# Patient Record
Sex: FEMALE | Race: OTHER | Employment: STUDENT | URBAN - METROPOLITAN AREA
[De-identification: names, ages, dates, MRNs, and addresses within clinical notes are randomized per-mention and may not be internally consistent; named-entity substitution may affect disease eponyms.]

---
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
## 2023-06-14 ENCOUNTER — NURSE ONLY (OUTPATIENT)
Dept: INTERNAL MEDICINE CLINIC | Facility: HOSPITAL | Age: 23
End: 2023-06-14
Attending: EMERGENCY MEDICINE

## 2023-06-14 DIAGNOSIS — Z00.00 WELLNESS EXAMINATION: Primary | ICD-10-CM

## 2023-06-14 PROCEDURE — 86480 TB TEST CELL IMMUN MEASURE: CPT

## 2023-06-16 LAB
M TB IFN-G CD4+ T-CELLS BLD-ACNC: 0.04 IU/ML
M TB TUBERC IFN-G BLD QL: NEGATIVE
M TB TUBERC IGNF/MITOGEN IGNF CONTROL: >10 IU/ML
QFT TB1 AG MINUS NIL: 0.01 IU/ML
QFT TB2 AG MINUS NIL: 0 IU/ML

## 2023-11-21 ENCOUNTER — OFFICE VISIT (OUTPATIENT)
Dept: FAMILY MEDICINE CLINIC | Facility: CLINIC | Age: 23
End: 2023-11-21

## 2023-11-21 VITALS
BODY MASS INDEX: 33 KG/M2 | SYSTOLIC BLOOD PRESSURE: 125 MMHG | DIASTOLIC BLOOD PRESSURE: 76 MMHG | WEIGHT: 186 LBS | HEART RATE: 58 BPM

## 2023-11-21 DIAGNOSIS — N83.202 CYSTS OF BOTH OVARIES: ICD-10-CM

## 2023-11-21 DIAGNOSIS — Z00.00 ENCOUNTER FOR ANNUAL HEALTH EXAMINATION: Primary | ICD-10-CM

## 2023-11-21 DIAGNOSIS — R10.2 PELVIC PAIN: ICD-10-CM

## 2023-11-21 DIAGNOSIS — N83.201 CYSTS OF BOTH OVARIES: ICD-10-CM

## 2023-11-21 DIAGNOSIS — F41.8 ANXIETY WITH DEPRESSION: ICD-10-CM

## 2023-11-21 DIAGNOSIS — Z23 NEEDS FLU SHOT: ICD-10-CM

## 2023-11-21 PROCEDURE — 3074F SYST BP LT 130 MM HG: CPT | Performed by: FAMILY MEDICINE

## 2023-11-21 PROCEDURE — 3078F DIAST BP <80 MM HG: CPT | Performed by: FAMILY MEDICINE

## 2023-11-21 PROCEDURE — 99385 PREV VISIT NEW AGE 18-39: CPT | Performed by: FAMILY MEDICINE

## 2023-12-27 ENCOUNTER — OFFICE VISIT (OUTPATIENT)
Dept: OBGYN CLINIC | Facility: CLINIC | Age: 23
End: 2023-12-27

## 2023-12-27 VITALS
WEIGHT: 190 LBS | HEART RATE: 54 BPM | SYSTOLIC BLOOD PRESSURE: 110 MMHG | DIASTOLIC BLOOD PRESSURE: 76 MMHG | BODY MASS INDEX: 31.65 KG/M2 | HEIGHT: 65 IN

## 2023-12-27 DIAGNOSIS — Z01.419 WELL WOMAN EXAM: Primary | ICD-10-CM

## 2023-12-27 DIAGNOSIS — Z11.3 SCREENING EXAMINATION FOR STD (SEXUALLY TRANSMITTED DISEASE): ICD-10-CM

## 2023-12-27 PROCEDURE — 99385 PREV VISIT NEW AGE 18-39: CPT | Performed by: STUDENT IN AN ORGANIZED HEALTH CARE EDUCATION/TRAINING PROGRAM

## 2023-12-27 PROCEDURE — 3008F BODY MASS INDEX DOCD: CPT | Performed by: STUDENT IN AN ORGANIZED HEALTH CARE EDUCATION/TRAINING PROGRAM

## 2023-12-27 PROCEDURE — 3074F SYST BP LT 130 MM HG: CPT | Performed by: STUDENT IN AN ORGANIZED HEALTH CARE EDUCATION/TRAINING PROGRAM

## 2023-12-27 PROCEDURE — 3078F DIAST BP <80 MM HG: CPT | Performed by: STUDENT IN AN ORGANIZED HEALTH CARE EDUCATION/TRAINING PROGRAM

## 2023-12-27 RX ORDER — NORETHINDRONE ACETATE AND ETHINYL ESTRADIOL, ETHINYL ESTRADIOL AND FERROUS FUMARATE 1MG-10(24)
1 KIT ORAL DAILY
Qty: 28 TABLET | Refills: 12 | Status: SHIPPED | OUTPATIENT
Start: 2023-12-27 | End: 2024-12-26

## 2023-12-28 LAB
C TRACH DNA SPEC QL NAA+PROBE: NEGATIVE
N GONORRHOEA DNA SPEC QL NAA+PROBE: NEGATIVE

## 2024-01-02 ENCOUNTER — PATIENT MESSAGE (OUTPATIENT)
Dept: OBGYN CLINIC | Facility: CLINIC | Age: 24
End: 2024-01-02

## 2024-01-02 RX ORDER — METRONIDAZOLE 500 MG/1
500 TABLET ORAL 2 TIMES DAILY
Qty: 14 TABLET | Refills: 0 | Status: SHIPPED | OUTPATIENT
Start: 2024-01-02

## 2024-01-02 NOTE — TELEPHONE ENCOUNTER
From: Alissa Blakely  To: JED HAMILTON  Sent: 1/2/2024 3:24 PM CST  Subject: Medication    Hi! So yes I did notice more discharge and to be on the safer side could I get the antibiotics?

## 2024-03-12 RX ORDER — NORETHINDRONE ACETATE AND ETHINYL ESTRADIOL, ETHINYL ESTRADIOL AND FERROUS FUMARATE 1MG-10(24)
1 KIT ORAL DAILY
Qty: 28 TABLET | Refills: 12 | OUTPATIENT
Start: 2024-03-12 | End: 2025-03-12

## 2024-07-10 ENCOUNTER — LAB ENCOUNTER (OUTPATIENT)
Dept: LAB | Age: 24
End: 2024-07-10
Attending: STUDENT IN AN ORGANIZED HEALTH CARE EDUCATION/TRAINING PROGRAM
Payer: COMMERCIAL

## 2024-07-10 ENCOUNTER — OFFICE VISIT (OUTPATIENT)
Dept: OBGYN CLINIC | Facility: CLINIC | Age: 24
End: 2024-07-10

## 2024-07-10 VITALS
SYSTOLIC BLOOD PRESSURE: 116 MMHG | HEART RATE: 60 BPM | HEIGHT: 65 IN | WEIGHT: 201 LBS | BODY MASS INDEX: 33.49 KG/M2 | DIASTOLIC BLOOD PRESSURE: 74 MMHG

## 2024-07-10 DIAGNOSIS — N92.1 BREAKTHROUGH BLEEDING ON BIRTH CONTROL PILLS: Primary | ICD-10-CM

## 2024-07-10 DIAGNOSIS — N92.1 BREAKTHROUGH BLEEDING ON BIRTH CONTROL PILLS: ICD-10-CM

## 2024-07-10 LAB — B-HCG SERPL-ACNC: <2.6 MIU/ML

## 2024-07-10 PROCEDURE — 99212 OFFICE O/P EST SF 10 MIN: CPT | Performed by: STUDENT IN AN ORGANIZED HEALTH CARE EDUCATION/TRAINING PROGRAM

## 2024-07-10 PROCEDURE — 84702 CHORIONIC GONADOTROPIN TEST: CPT

## 2024-07-10 PROCEDURE — 36415 COLL VENOUS BLD VENIPUNCTURE: CPT

## 2024-07-10 NOTE — PROGRESS NOTES
[facility]  Obstetrics and Gynecology  Gyne Problem Visit      Alissa Blakely is a 23 year old female  presenting for irregular menses for the last 2-3 months. She is currently on Lo Loestrin. She has only missed one dose since starting medication in January of this year. Stopped in April and restarted in May. Took plan B in . Patient's last menstrual period was 2024 (exact date). States she only spotted for about 2 hours. She is currently on her second week on her pack. She denies any abnormal vaginal discharge, odor, irritation, or itching. No new sexual partners.     Pap: 2023  Contraception: OCPs    OBSTETRICS HISTORY:  OB History    Para Term  AB Living   0 0 0 0 0 0   SAB IAB Ectopic Multiple Live Births   0 0 0 0 0       GYNE HISTORY:      Menarche: 12 (2023  1:05 PM)  Period Cycle (Days): monthly (2023  1:05 PM)  Period Duration (Days): 3 (2023  1:05 PM)  Period Flow: light (2023  1:05 PM)  Use of Birth Control (if yes, specify type): None (2023  1:05 PM)  Hx Prior Abnormal Pap: No (2023  1:05 PM)        Latest Ref Rng & Units 2023     1:39 PM   RECENT PAP RESULTS   Thinprep Pap Negative for intraepithelial lesion or malignancy Negative for intraepithelial lesion or malignancy          History   Sexual Activity    Sexual activity: Not on file       MEDICAL HISTORY:  No past medical history on file.  Past Surgical History:   Procedure Laterality Date    Knee surgery Left        SOCIAL HISTORY:  Social History     Socioeconomic History    Marital status: Single     Spouse name: Not on file    Number of children: Not on file    Years of education: Not on file    Highest education level: Not on file   Occupational History    Not on file   Tobacco Use    Smoking status: Never     Passive exposure: Never    Smokeless tobacco: Never   Vaping Use    Vaping status: Never Used   Substance and Sexual Activity    Alcohol use: Yes    Drug use: Yes      Types: Cannabis    Sexual activity: Not on file   Other Topics Concern    Not on file   Social History Narrative    Not on file     Social Determinants of Health     Financial Resource Strain: Not on file   Food Insecurity: Not on file   Transportation Needs: Not on file   Physical Activity: Not on file   Stress: Not on file   Social Connections: Not on file   Housing Stability: Not on file       MEDICATIONS:    Current Outpatient Medications:     Norethin-Eth Estrad-Fe Biphas (LO LOESTRIN FE) 1 MG-10 MCG / 10 MCG Oral Tab, Take 1 tablet by mouth daily., Disp: 28 tablet, Rfl: 12    ALLERGIES:  No Known Allergies      REVIEW OF SYSTEMS:  Review of Systems   Constitutional:  Negative for appetite change, chills and fever.   Gastrointestinal:  Negative for abdominal pain, constipation, diarrhea, nausea and vomiting.   Genitourinary:  Negative for difficulty urinating, dysuria, flank pain, frequency, genital sores, hematuria, menstrual problem, pelvic pain, urgency, vaginal bleeding, vaginal discharge and vaginal pain.   Skin:  Negative for rash.   Neurological:  Negative for dizziness, light-headedness and headaches.       PHYSICAL EXAM:  /74 (BP Location: Left arm, Patient Position: Sitting, Cuff Size: adult)   Pulse 60   Ht 5' 5\" (1.651 m)   Wt 201 lb (91.2 kg)   LMP 07/08/2024 (Exact Date)   BMI 33.45 kg/m²     GENERAL: well developed, well nourished, in no apparent distress  ABDOMEN: Soft, non distended; non tender, no masses    ASSESSMENT:       ICD-10-CM    1. Breakthrough bleeding on birth control pills  N92.1 HCG, Beta Subunit (Quant Pregnancy Test)          Plan:  - We discussed cause of irregular bleeding being due to not being consistent with taking OCPs as well as taking Plan B. I advised that she continue with current medication and avoid missing doses for next 3 months. If symptoms persist after time frame, then we will discuss switching birth control method. Pt is in agreement with plan.        JED HAMILTON PA-C  11:12 AM  7/10/2024

## 2024-08-14 ENCOUNTER — OFFICE VISIT (OUTPATIENT)
Dept: OTOLARYNGOLOGY | Facility: CLINIC | Age: 24
End: 2024-08-14

## 2024-08-14 DIAGNOSIS — J34.3 HYPERTROPHY OF NASAL TURBINATES: ICD-10-CM

## 2024-08-14 DIAGNOSIS — J30.1 ALLERGIC RHINITIS DUE TO POLLEN, UNSPECIFIED SEASONALITY: ICD-10-CM

## 2024-08-14 DIAGNOSIS — R09.81 NASAL CONGESTION: ICD-10-CM

## 2024-08-14 DIAGNOSIS — J34.89 NASAL OBSTRUCTION: ICD-10-CM

## 2024-08-14 DIAGNOSIS — J34.2 DEVIATED NASAL SEPTUM: Primary | ICD-10-CM

## 2024-08-14 PROCEDURE — 99203 OFFICE O/P NEW LOW 30 MIN: CPT | Performed by: STUDENT IN AN ORGANIZED HEALTH CARE EDUCATION/TRAINING PROGRAM

## 2024-08-14 RX ORDER — AZELASTINE 1 MG/ML
2 SPRAY, METERED NASAL 2 TIMES DAILY
Qty: 30 ML | Refills: 3 | Status: SHIPPED | OUTPATIENT
Start: 2024-08-14

## 2024-08-14 RX ORDER — FLUTICASONE PROPIONATE 50 MCG
2 SPRAY, SUSPENSION (ML) NASAL 2 TIMES DAILY
Qty: 16 G | Refills: 3 | Status: SHIPPED | OUTPATIENT
Start: 2024-08-14

## 2024-08-14 NOTE — PROGRESS NOTES
[place]  OTOLARYNGOLOGY - HEAD & NECK SURGERY    8/14/2024     Reason for Consultation:   Nasal congestion   History of Present Illness:   Patient is a pleasant 23 year old female who is being seen for longstanding nasal congestion.  Patient states that this bothers her mostly at night and at times it is difficult for her to fall asleep because of the congestion.  She states that the congestion does alternate.  She has not had any previous nasal surgery.  She has not tried any nasal sprays for this yet.  No allergy management yet.  She is here for further evaluation and treatment of her nasal congestion.  She denies any previous nasal trauma.    Past Medical History  History reviewed. No pertinent past medical history.    Past Surgical History  Past Surgical History:   Procedure Laterality Date    Knee surgery Left        Family History  Family History   Problem Relation Age of Onset    Diabetes Maternal Grandmother     Diabetes Paternal Grandmother     Cancer Maternal Aunt        Social History  Pediatric History   Patient Parents    Barb Blakely (Mother)    Renny Davalos (Father)     Other Topics Concern    Not on file   Social History Narrative    Not on file           Current Medications:  Current Outpatient Medications   Medication Sig Dispense Refill    azelastine 0.1 % Nasal Solution 2 sprays by Nasal route 2 (two) times daily. 30 mL 3    fluticasone propionate 50 MCG/ACT Nasal Suspension 2 sprays by Nasal route 2 (two) times daily. 16 g 3    Norethin-Eth Estrad-Fe Biphas (LO LOESTRIN FE) 1 MG-10 MCG / 10 MCG Oral Tab Take 1 tablet by mouth daily. 28 tablet 12       Allergies  No Known Allergies    Review of Systems:   A comprehensive 10 point review of systems was completed.  Pertinent positives and negatives noted in the the HPI.    Physical Exam:   Last menstrual period 07/08/2024, not currently breastfeeding.    GENERAL: No acute distress, Comfortable appearing  FACE: HB 1/6, Normal  Animation  HEAD: Normocephalic  EYES: EOMI, pupils equil  EARS: Bilateral Auricles Symmetric, bilateral tympanic membranes appear normal  NOSE: Nares patent bilaterally  ORAL CAVITY: Tongue mobile, Oropharynx clear, Floor of mouth clear, Posterior oropharynx normal  NECK: No palpable lymphadenopathy, thyroid not palpable, nontender    PROCEDURE: BILATERAL RIGID NASAL ENDOSCOPY  Bilateral rigid nasal endoscopy (96954) was performed. Verbal consent was obtained from the patient to proceed with rigid nasal endoscopy.  A rigid 4mm 30 degree nasal endoscope was used to examine both nasal cavities. The inferior meatus, inferior turbinate, nasopharynx, middle meatus, middle turbinate, superior meatus, superior turbinate, and sphenoethmoidal recess were examined bilaterally and deemed to be normal, with any exceptions as noted below. At the completion of the procedure the endoscope was removed. The patient tolerated the procedure well. There were no complications.    Findings: The bilateral inferior turbinates were enlarged bilaterally especially on the right. The Septum was deviated to the left caudally as well as posteriorly with septal spur. The middle meatus was patent bilaterally without any pus drainage. There were no obvious masses or polyps noted.    Results:     Laboratory Data:  Lab Results   Component Value Date    WBC 9.6 02/12/2018    HGB 11.4 (L) 02/12/2018    HCT 35.3 02/12/2018     02/12/2018    CREATSERUM 0.72 02/12/2018    BUN 10 02/12/2018     02/12/2018    K 3.5 02/12/2018     02/12/2018    CO2 22 02/12/2018    GLU 98 02/12/2018    CA 9.6 02/12/2018         Imaging:  No results found.      Impression:       ICD-10-CM    1. Deviated nasal septum  J34.2       2. Hypertrophy of nasal turbinates  J34.3       3. Nasal congestion  R09.81       4. Nasal obstruction  J34.89       5. Allergic rhinitis due to pollen, unspecified seasonality  J30.1            Recommendations:  I would like to  treat her with Flonase and azelastine in combination.  I would like her to return to see me in 1 month for reevaluation.  If she is still unable to breathe well through her nose we can consider septoplasty and turbinate reduction.    Thank you for allowing me to participate in the care of your patient.    Simeon Cartagena,    Otolaryngology/Rhinology, Sinus, and Endoscopic Skull Base Surgery  [address] [place] [id number]  Phone [phone]  Fax [phone]  8/14/2024  4:45 PM  8/14/2024

## 2024-08-16 ENCOUNTER — TELEPHONE (OUTPATIENT)
Dept: OTOLARYNGOLOGY | Facility: CLINIC | Age: 24
End: 2024-08-16

## 2024-08-16 RX ORDER — FLUTICASONE PROPIONATE 50 MCG
2 SPRAY, SUSPENSION (ML) NASAL 2 TIMES DAILY
Qty: 16 G | Refills: 3 | Status: SHIPPED | OUTPATIENT
Start: 2024-08-16

## 2024-08-16 RX ORDER — AZELASTINE 1 MG/ML
2 SPRAY, METERED NASAL 2 TIMES DAILY
Qty: 30 ML | Refills: 3 | Status: SHIPPED | OUTPATIENT
Start: 2024-08-16

## 2024-08-16 NOTE — TELEPHONE ENCOUNTER
Pt is requesting the following medication      azelastine 0.1 % Nasal Solution, 2 sprays by Nasal route 2 (two) times daily., Disp: 30 mL, Rfl: 3      fluticasone propionate 50 MCG/ACT Nasal Suspension, 2 sprays by Nasal route 2 (two) times daily., Disp: 16 g, Rfl: 3

## 2024-08-16 NOTE — TELEPHONE ENCOUNTER
They were sent before to [facility] [place] not sure why they didn't go through? I will send them again

## 2024-08-23 RX ORDER — NORETHINDRONE ACETATE AND ETHINYL ESTRADIOL, ETHINYL ESTRADIOL AND FERROUS FUMARATE 1MG-10(24)
1 KIT ORAL DAILY
Qty: 28 TABLET | Refills: 12 | OUTPATIENT
Start: 2024-08-23 | End: 2025-08-23

## 2024-09-03 ENCOUNTER — TELEPHONE (OUTPATIENT)
Dept: INTERNAL MEDICINE CLINIC | Facility: HOSPITAL | Age: 24
End: 2024-09-03

## 2024-09-03 DIAGNOSIS — Z20.822 SUSPECTED COVID-19 VIRUS INFECTION: Primary | ICD-10-CM

## 2024-09-03 NOTE — TELEPHONE ENCOUNTER
[x] EH  []LUIS ALBERTO   [] [facility]  Manager : Ro Hendrickson    [] Direct Patient Care  [x]Indirect Patient Contact   [] Non-Clinical/No Patient Contact    For Direct Patient Care ONLY: Have you been fitted with an N95 mask within the last 12 months? [] Yes  [x]No      High Risk Area:    [] Yes   [x] No    HAVE YOU RECEIVED THE COVID-19 Vaccine? Yes []    No []          If yes, date(s) received: 07/08/2021 08/04/2021           Which vaccine:  Pfizer [x]     Moderna []    J&J []      SYMPTOMS (reported via dashboard):  [] asymptomatic  [x] symptomatic  [] GI symptoms only    Symptom onset date: 09/02/2024  Fever   > 100F             Yes [x]      Cough                          Yes [x]      Shortness of breath  Yes []      Congestion                 Yes [x]      Runny nose                Yes [x]        Loss of Smell              Yes []        Loss of Taste             Yes []       Sore throat                 Yes [x]       Fatigue                        Yes [x]       Body Aches                Yes [x]        Chills                           Yes []        Headache                   Yes [x]             GI symptoms             Yes [x]     No []                     Nausea   [x]          Vomiting            [x]                                    Diarrhea  [x]          Upset stomach [x]      Employee reported COVID Exposure?  Yes [x]     No []    Date of exposure: 08/31/2024  []  Coworker                       [] patient                        [x] Family/friend    PPE:   [] N95 Mask/PAPR  [] Standard Mask  [] Eyewear  [x] None    Within 6 feet for >15 minutes? [x] Yes []  No    Is this a true exposure? [x]  Yes []  No    When was the last shift you worked?: 08/30/2024    Employee has a history of Covid?  Yes []     No [x]   If Yes, when:    Employee is immunocompromised?  Yes []     No [x]      PLAN:     COVID-19 testing ordered:   [x] Rapid      [] [facility]              Date test is to be taken:   09/03/2024    []  No testing  required at this time  []  Outside testing                           Notes:    INSTRUCTIONS PROVIDED:    [x]  Employee was instructed to call Central scheduling at [phone] or use Evi (remove any insurance information listed) to make an appointment for their testing   [x]  May return to work if employee views negative result in MyChart and remains fever, vomiting, and diarrhea free  []  May continue to work if remains asymptomatic and views negative result in MyChart  []  Follow up for condition update when resulting  []  If symptoms develop, stay home and call hotline for rapid test order  []  If COVID positive results, off work minimum of 5 days from positive test or onset of symptoms (day 0)     [x]  Plan noted above  [x]  Length of time to obtain results  []  Quarantine instructions  [x]  S/S of worsening infection/condition and importance of prompt medical re-evaluation including when to seek emergency care.   [x] The employee voiced understanding

## 2024-09-03 NOTE — TELEPHONE ENCOUNTER
Outside Covid Testing done    Results and RTW guidelines:    COVID RESULT reported:      Test type:    [] Rapid outside         [] PCR outside       [x] Home Test    Date of test: 09/03/2024     Test location: Home         [] Result viewed in Epic with verbal consent received from employee     [x] Results per Employee Covid Dashboard    [] Best Practice    [] Employee instructed to email copy of result to [email or web address].      [x] Positive  - Employee should quarantine at home for at least 5 days (day 1 is day after sx onset) , follow the CDC guidelines for cleaning and                              quarantining; see CDC.gov   -This employee may RTW on day 6 if asymptomatic or mildly symptomatic (with improving symptoms).  Call Employee Health on day 5 if unable to return on                      day 6 after symptom onset.    -This employee needs to call Employee Health on day 5 after symptom onset.  The employee needs to be cleared by Employee Health.   - If Employee is still experiencing severe symptoms must make a RTW appt with Employee Health, Employee will not be cleared if:    1. Has consistent cough, shortness of breath or fatigue that restricts your physical activities    2. Is still feeling \"unwell\"    3. Within 15 days of hospitalization for COVID    4. Within 20 days of intubation for COVID    5. Still has a fever, vomiting or diarrhea   - Keep communication open with management about RTW and if symptoms worsen  - Monitor sx and temperature                  - Notify PCP of result                 - Seek emergent care with worsening symptoms       Notes:     RTW PLAN:    [x]  If COVID positive results, off work minimum of 5 days from positive test or onset of symptoms (day 0)        On day 5, if asymptomatic or mildly symptomatic (with improving symptoms) may return to work day 6          On day 5, if symptomatic, call Employee Health for RTW screening        []  COVID positive result - call  Employee Health on day 5 after symptom onset.  The employee needs to be cleared by Employee Health to RTW.  [] RTW immediately, continue to monitor for sx  [] RTW when sx improve; must be fever free for 24 hours w/o medications, Diarrhea/Vomiting free for 24 hours w/o medications  [] [facility] ordered; continue to monitor sx and call for new/worsening sx.  Discuss RTW guidelines with manager  [] May continue to work  [x] Follow up with PCP  [] Home until further instruction from hotline with [facility] results  INSTRUCTIONS PROVIDED:  [x]  Plan as noted above  []  Length of time to obtain results  []  May return to work if views negative in My Chart and  remains fever, vomiting, and diarrhea free  []  May continue to work if remains asymptomatic   [x]  Quarantine instructions  [x]  Masking protocol  []  S/S of worsening infection/condition and importance of prompt medical re-evaluation including when to seek emergency care  [] If symptoms develop, stay home and call hotline for rapid test order    Estimated RTW date:  09/08/2024  [x] Manager notified

## 2024-09-11 ENCOUNTER — TELEPHONE (OUTPATIENT)
Dept: OBGYN CLINIC | Facility: CLINIC | Age: 24
End: 2024-09-11

## 2024-09-11 NOTE — TELEPHONE ENCOUNTER
Pt is requesting refill for the following medication          Norethin-Eth Estrad-Fe Biphas (LO LOESTRIN FE) 1 MG-10 MCG / 10 MCG Oral Tab, Take 1 tablet by mouth daily., Disp: 28 tablet, Rfl: 12

## 2024-09-12 RX ORDER — NORETHINDRONE ACETATE AND ETHINYL ESTRADIOL, ETHINYL ESTRADIOL AND FERROUS FUMARATE 1MG-10(24)
1 KIT ORAL DAILY
Qty: 28 TABLET | Refills: 12 | Status: SHIPPED | OUTPATIENT
Start: 2024-09-12 | End: 2025-09-12

## 2024-09-12 RX ORDER — NORETHINDRONE ACETATE AND ETHINYL ESTRADIOL, ETHINYL ESTRADIOL AND FERROUS FUMARATE 1MG-10(24)
1 KIT ORAL DAILY
Qty: 28 TABLET | Refills: 12 | OUTPATIENT
Start: 2024-09-12 | End: 2025-09-12

## 2024-09-19 ENCOUNTER — OFFICE VISIT (OUTPATIENT)
Dept: FAMILY MEDICINE CLINIC | Facility: CLINIC | Age: 24
End: 2024-09-19

## 2024-09-19 VITALS
SYSTOLIC BLOOD PRESSURE: 109 MMHG | WEIGHT: 205 LBS | HEART RATE: 59 BPM | DIASTOLIC BLOOD PRESSURE: 72 MMHG | BODY MASS INDEX: 34 KG/M2

## 2024-09-19 DIAGNOSIS — R11.0 NAUSEA: ICD-10-CM

## 2024-09-19 DIAGNOSIS — G44.209 TENSION HEADACHE: Primary | ICD-10-CM

## 2024-09-19 PROCEDURE — 99214 OFFICE O/P EST MOD 30 MIN: CPT | Performed by: FAMILY MEDICINE

## 2024-09-19 NOTE — PROGRESS NOTES
Chief Complaint   Patient presents with    Headache     C/o frequent headaches x 1 week    Nausea     C/o nausea and vomiting    Weight Problem     C/o weight gain       HPI:   Alissa Blakely is a 24 year old female who presents to clinic with complaints of daily headaches.   Staring at computers for work and watches tv/ phone at home after work.   Excedrin not helping.     Got home after car ride and had emesis episodes was dizzy.   +Light sensitivity during that episodes.   Notes the headaches are worse after driving home.     Gaining weight, minimal appetite d/t headaches but occ snacking/ binge eating.   On ocp, started Spring 2024.     REVIEW OF SYSTEMS:   Negative, except per HPI.     EXAM:   /72 (BP Location: Right arm, Patient Position: Sitting, Cuff Size: large)   Pulse 59   Wt 205 lb (93 kg)   LMP 09/12/2024 (Approximate)   BMI 34.11 kg/m²   Body mass index is 34.11 kg/m².  GENERAL: well developed, well nourished, in no apparent distress  SKIN: no rashes, no suspicious lesions  HEENT: atraumatic, normocephalic  EYES: PERRLA, EOMI,conjunctiva are clear  NECK: supple, no adenopathy  NEURO: Oriented times three, motor and sensory are grossly intact    ASSESSMENT AND PLAN:   1. Tension headache  - headaches sound like tension headaches.   - ibuprofen prn with food  - trial nightly magnesium supplementation.     2. Nausea  - Take dramamine before car ride to pre-medicate.      RTC if no improvement in symptoms. Red flags discussed to go to ER.     Maura Beltran MD  9/19/2024  4:49 PM

## 2024-12-10 ENCOUNTER — OFFICE VISIT (OUTPATIENT)
Dept: FAMILY MEDICINE CLINIC | Facility: CLINIC | Age: 24
End: 2024-12-10

## 2024-12-10 VITALS
BODY MASS INDEX: 34.55 KG/M2 | HEART RATE: 69 BPM | SYSTOLIC BLOOD PRESSURE: 114 MMHG | WEIGHT: 207.38 LBS | HEIGHT: 65 IN | DIASTOLIC BLOOD PRESSURE: 78 MMHG

## 2024-12-10 DIAGNOSIS — Z00.00 ENCOUNTER FOR ANNUAL HEALTH EXAMINATION: Primary | ICD-10-CM

## 2024-12-10 DIAGNOSIS — E55.9 VITAMIN D DEFICIENCY: ICD-10-CM

## 2024-12-10 DIAGNOSIS — N83.201 CYSTS OF BOTH OVARIES: ICD-10-CM

## 2024-12-10 DIAGNOSIS — R63.2 BINGE EATING: ICD-10-CM

## 2024-12-10 DIAGNOSIS — N83.202 CYSTS OF BOTH OVARIES: ICD-10-CM

## 2024-12-10 PROCEDURE — 99395 PREV VISIT EST AGE 18-39: CPT | Performed by: FAMILY MEDICINE

## 2024-12-10 RX ORDER — PHENTERMINE HYDROCHLORIDE 15 MG/1
15 CAPSULE ORAL EVERY MORNING
Qty: 30 CAPSULE | Refills: 0 | Status: SHIPPED | OUTPATIENT
Start: 2024-12-10

## 2024-12-10 NOTE — PROGRESS NOTES
HPI:   Alissa Blakely is a 24 year old female who presents for a complete physical exam.        Wt Readings from Last 3 Encounters:   12/10/24 207 lb 6.4 oz (94.1 kg)   09/19/24 205 lb (93 kg)   07/10/24 201 lb (91.2 kg)     Body mass index is 34.51 kg/m².       Current Outpatient Medications   Medication Sig Dispense Refill    Phentermine HCl 15 MG Oral Cap Take 1 capsule (15 mg total) by mouth every morning. 30 capsule 0    Norethin-Eth Estrad-Fe Biphas (LO LOESTRIN FE) 1 MG-10 MCG / 10 MCG Oral Tab Take 1 tablet by mouth daily. 28 tablet 12      History reviewed. No pertinent past medical history.   Past Surgical History:   Procedure Laterality Date    Knee surgery Left       Family History   Problem Relation Age of Onset    Diabetes Maternal Grandmother     Diabetes Paternal Grandmother     Cancer Maternal Aunt       Social History:   Social History     Socioeconomic History    Marital status: Single   Tobacco Use    Smoking status: Never     Passive exposure: Never    Smokeless tobacco: Never   Vaping Use    Vaping status: Never Used   Substance and Sexual Activity    Alcohol use: Yes    Drug use: Yes     Types: Cannabis          REVIEW OF SYSTEMS:   Negative, except per HPI.     EXAM:   /78 (BP Location: Left arm, Patient Position: Sitting, Cuff Size: adult)   Pulse 69   Ht 5' 5\" (1.651 m)   Wt 207 lb 6.4 oz (94.1 kg)   LMP 12/06/2024 (Exact Date)   BMI 34.51 kg/m²     GENERAL: well developed, well nourished, in no apparent distress  SKIN: no rashes, no suspicious lesions  HEENT: atraumatic, normocephalic, ears are clear  EYES: PERRLA, EOMI,conjunctiva are clear  NECK: supple, no adenopathy  LUNGS: clear to auscultation  CARDIO: RRR without murmur  GI: good BS, no masses or tenderness  MUSCULOSKELETAL: back is not tender, FROM of the back  EXTREMITIES: no cyanosis or edema  NEURO: Oriented times three, cranial nerves are intact, motor and sensory are grossly intact    ASSESSMENT AND PLAN:   Alissa  Reddy is a 24 year old female who presents for a complete physical exam.    1. Encounter for annual health examination  -Medical, surgical and social history, as well as medications and allergies were reviewed with patient.  - CBC With Differential With Platelet; Future  - Comp Metabolic Panel (14); Future  - Hemoglobin A1C; Future  - Lipid Panel; Future  - TSH W Reflex To Free T4; Future  - Vitamin D; Future    2. Vitamin D deficiency  - Vitamin D; Future    3. BMI 34.0-34.9,adult  - Hemoglobin A1C; Future  - Lipid Panel; Future    4. Cysts of both ovaries  L sided pelvic pain   - US PELVIS (TRANSVAGINAL ONLY) (CPT=76830); Future    5. Binge eating  [place]  Appropriate use discussed with patient.  - Phentermine HCl 15 MG Oral Cap; Take 1 capsule (15 mg total) by mouth every morning.  Dispense: 30 capsule; Refill: 0     RTC if no improvement in symptoms. Red flags discussed to go to ER.     Maura Beltran MD  12/10/2024  3:06 PM

## 2025-01-09 ENCOUNTER — OFFICE VISIT (OUTPATIENT)
Dept: FAMILY MEDICINE CLINIC | Facility: CLINIC | Age: 25
End: 2025-01-09

## 2025-01-09 VITALS
HEART RATE: 63 BPM | DIASTOLIC BLOOD PRESSURE: 80 MMHG | BODY MASS INDEX: 34 KG/M2 | WEIGHT: 207 LBS | SYSTOLIC BLOOD PRESSURE: 117 MMHG

## 2025-01-09 DIAGNOSIS — L30.9 DERMATITIS: Primary | ICD-10-CM

## 2025-01-09 PROCEDURE — 99214 OFFICE O/P EST MOD 30 MIN: CPT | Performed by: FAMILY MEDICINE

## 2025-01-09 RX ORDER — CLOTRIMAZOLE AND BETAMETHASONE DIPROPIONATE 10; .64 MG/G; MG/G
1 CREAM TOPICAL 2 TIMES DAILY PRN
Qty: 15 G | Refills: 0 | Status: SHIPPED | OUTPATIENT
Start: 2025-01-09

## 2025-01-09 NOTE — PROGRESS NOTES
Chief Complaint   Patient presents with    Derm Problem     C/o redness below left armpit, pain with palpation x 1 week       HPI:   Alissa Blakely is a 24 year old female who presents to clinic with complaints of red rash on right side bra line next to the R breast.   Has not tried any topical. No itching.     REVIEW OF SYSTEMS:   Negative, except per HPI.     EXAM:   /80 (BP Location: Left arm, Patient Position: Sitting, Cuff Size: large)   Pulse 63   Wt 207 lb (93.9 kg)   LMP 01/04/2025 (Exact Date)   BMI 34.45 kg/m²   Body mass index is 34.45 kg/m².  GENERAL: well developed, well nourished, in no apparent distress  SKIN: +   HEENT: atraumatic, normocephalic  EYES: PERRLA, EOMI,conjunctiva are clear  NECK: supple, no adenopathy  ASSESSMENT AND PLAN:     Dermatitis  New issue, slightly ttp, non itchy. Has not tried anything topical.  If no improvement will send to dermatology.  Advised patient to keep me informed.  - clotrimazole-betamethasone 1-0.05 % External Cream; Apply 1 Application topically 2 (two) times daily as needed.  Dispense: 15 g; Refill: 0     RTC if no improvement in symptoms. Red flags discussed to go to ER.     Maura Beltran MD  1/9/2025  1:03 PM

## 2025-04-29 ENCOUNTER — APPOINTMENT (OUTPATIENT)
Dept: GENERAL RADIOLOGY | Age: 25
End: 2025-04-29
Attending: Physician Assistant
Payer: COMMERCIAL

## 2025-04-29 ENCOUNTER — HOSPITAL ENCOUNTER (OUTPATIENT)
Age: 25
Discharge: HOME | End: 2025-04-29
Payer: COMMERCIAL

## 2025-04-29 VITALS
SYSTOLIC BLOOD PRESSURE: 133 MMHG | DIASTOLIC BLOOD PRESSURE: 84 MMHG | TEMPERATURE: 98 F | RESPIRATION RATE: 12 BRPM | HEART RATE: 77 BPM | OXYGEN SATURATION: 96 %

## 2025-04-29 DIAGNOSIS — S93.401A SPRAIN OF RIGHT ANKLE, UNSPECIFIED LIGAMENT, INITIAL ENCOUNTER: Primary | ICD-10-CM

## 2025-04-29 PROCEDURE — 73610 X-RAY EXAM OF ANKLE: CPT | Performed by: PHYSICIAN ASSISTANT

## 2025-04-29 PROCEDURE — 99214 OFFICE O/P EST MOD 30 MIN: CPT | Performed by: PHYSICIAN ASSISTANT

## 2025-04-29 PROCEDURE — 73630 X-RAY EXAM OF FOOT: CPT | Performed by: PHYSICIAN ASSISTANT

## 2025-04-29 PROCEDURE — A6449 LT COMPRES BAND >=3" <5"/YD: HCPCS | Performed by: PHYSICIAN ASSISTANT

## 2025-04-29 PROCEDURE — L4350 ANKLE CONTROL ORTHO PRE OTS: HCPCS | Performed by: PHYSICIAN ASSISTANT

## 2025-04-29 NOTE — ED PROVIDER NOTES
Chief Complaint   Patient presents with    Ankle Pain       History obtained from: patient   services not used     HPI:     Alissa Blakely is a 24 year old female who presents with right ankle and foot pain and swelling following injury yesterday. Patient states she rolled her ankle while walking and heard a \"crack\" to ankle. Denies any other injuries sustained or complaints at this time. Denies head injury, numbness, weakness, wound, change in skin color/temperature.     PMH  Past Medical History[1]    PFSH    PFS asessment screens reviewed and agree.  Nurses notes reviewed I agree with documentation.    Family History[2]  Family history reviewed with patient/caregiver and is not pertinent to presenting problem.  Social History     Socioeconomic History    Marital status: Single     Spouse name: Not on file    Number of children: Not on file    Years of education: Not on file    Highest education level: Not on file   Occupational History    Not on file   Tobacco Use    Smoking status: Never     Passive exposure: Never    Smokeless tobacco: Never   Vaping Use    Vaping status: Never Used   Substance and Sexual Activity    Alcohol use: Yes    Drug use: Yes     Types: Cannabis    Sexual activity: Not on file   Other Topics Concern    Not on file   Social History Narrative    Not on file     Social Drivers of Health     Food Insecurity: Not on file   Transportation Needs: Not on file   Housing Stability: Not on file         ROS:   Positive for stated complaint: right ankle and foot pain and swelling   Other systems are as noted in HPI.   All other systems reviewed and negative except as noted above.    Physical Exam:   Vital signs and nursing note reviewed.       /84   Pulse 77   Temp 98.2 °F (36.8 °C) (Oral)   Resp 12   LMP 04/08/2025 (Exact Date)   SpO2 96%     GENERAL: well developed, no acute distress, non-toxic appearing   SKIN: good skin turgor, no obvious rashes  HEAD: normocephalic,  atraumatic  EYES: sclera non-icteric bilaterally, conjunctiva clear bilaterally  OROPHARYNX: MMM, maintaining airway and secretions  NECK: no nuchal rigidity, no trismus, no edema, phonation normal    CARDIO: regular rate, DP pulse 2+ bilaterally, cap refill < 2 sec   LUNGS: no increased WOB  EXTREMITIES: swelling and tenderness to lateral right ankle and lateral right foot, no obvious deformity, ROM somewhat limited due to pain and swelling, compartments soft, CMS intact, skin intact without overlying changes   NEURO: no focal deficits  PSYCH: alert and oriented x3, answering questions appropriately, mood appropriate    MDM/Assessment/Plan:   Orders for this encounter:    Orders Placed This Encounter    XR ANKLE (MIN 3 VIEWS), RIGHT (CPT=73610)     What is the Relevant Clinical Indication / Reason for Exam?:   Rt foot pain     Release to patient:   Immediate    XR FOOT, COMPLETE (MIN 3 VIEWS), RIGHT (CPT=73630)     What is the Relevant Clinical Indication / Reason for Exam?:   Rt foot pain     Release to patient:   Immediate    Ace wrap     To affected area    Ankle splint       Labs performed this visit:  No results found for this or any previous visit (from the past 10 hours).    Imaging performed this visit:  XR ANKLE (MIN 3 VIEWS), RIGHT (CPT=73610)   Final Result   PROCEDURE: XR ANKLE (MIN 3 VIEWS), RIGHT (CPT=73610)       COMPARISON: None.       INDICATIONS: Right lateral ankle pain post injury yesterday.       TECHNIQUE: 3 views were obtained.         FINDINGS:    BONES: Normal. No significant arthropathy, fracture or acute abnormality.   SOFT TISSUES: Lateral soft tissue swelling.   EFFUSION: Ankle joint effusion.     OTHER: Negative.                    =====   CONCLUSION:    1. No acute fracture or subluxation.               Dictated by (CST): Jose Antonio Macedo MD on 4/29/2025 at 6:38 PM        Finalized by (CST): Jose Antonio Macedo MD on 4/29/2025 at 6:39 PM               XR FOOT, COMPLETE (MIN 3 VIEWS), RIGHT  (CPT=73630)   Final Result   PROCEDURE: XR FOOT, COMPLETE (MIN 3 VIEWS), RIGHT (CPT=73630)       COMPARISON: None.       INDICATIONS: Right lateral foot pain post injury yesterday.       TECHNIQUE: 3 views were obtained.         FINDINGS:    BONES: Normal. No significant arthropathy, fracture or acute abnormality.   SOFT TISSUES: Negative. No visible soft tissue swelling.    EFFUSION: None visible.    OTHER: Negative.                    =====   CONCLUSION: Normal examination.                 Dictated by (CST): Jose Antonio Macedo MD on 4/29/2025 at 6:38 PM        Finalized by (CST): Jose Antonio Macedo MD on 4/29/2025 at 6:38 PM                   Medical Decision Making  DDx includes sprain versus fracture versus contusion versus dislocation versus other.  Patient is overall well-appearing with stable vitals presenting with right ankle and foot pain and swelling following injury yesterday.  No signs of neurovascular compromise or compartment syndrome.    X-rays of foot and ankle independently reviewed, no fracture, lateral ankle soft tissue swelling noted.  Discussed results with patient who is reassured by these findings.  Ace wrap and Velcro stirrup ankle splint applied.  Patient declines crutches and is ambulating with steady gait.  Discussed supportive care including rest, ice, elevation, compression, and OTC Tylenol/Motrin as needed for pain.  Instructed patient to go directly to nearest ER with any worsening or concerning symptoms.  Follow-up with Ortho.    Amount and/or Complexity of Data Reviewed  Radiology: ordered and independent interpretation performed.    Risk  OTC drugs.  Prescription drug management.          Diagnosis:    ICD-10-CM    1. Sprain of right ankle, unspecified ligament, initial encounter  S93.401A           All results reviewed and discussed with patient/patient's family. Patient/patient's family verbalize excellent understanding of instructions and feels comfortable with plan. All of  patient's/patient's family's questions were addressed.   See AVS for detailed discharge instructions for your condition today.    Follow Up with:  Orthopaedic  Service  Call [phone], option 3 to arrange ortho follow up          Note: This document was dictated using Dragon medical dictation software.  Proofreading was performed to the best of my ability, but errors may be present.    Nava Soto PA-C       [1] History reviewed. No pertinent past medical history.  [2]   Family History  Problem Relation Age of Onset    Diabetes Maternal Grandmother     Diabetes Paternal Grandmother     Cancer Maternal Aunt

## 2025-04-29 NOTE — ED INITIAL ASSESSMENT (HPI)
Pt states she rolled on her right ankle yesterday.  +swelling an pain to the right ankle and foot.

## 2025-04-29 NOTE — DISCHARGE INSTRUCTIONS
Rest, ice, and elevate ankle   Alternate Motrin (ibuprofen) / Tylenol (acetaminophen) as needed for pain   Drink plenty of fluids   Get plenty of rest   Avoid excessive twisting, bending, or lifting   Follow up with ortho

## 2025-05-07 ENCOUNTER — APPOINTMENT (OUTPATIENT)
Dept: CT IMAGING | Facility: HOSPITAL | Age: 25
End: 2025-05-07
Attending: NURSE PRACTITIONER
Payer: COMMERCIAL

## 2025-05-07 ENCOUNTER — HOSPITAL ENCOUNTER (EMERGENCY)
Facility: HOSPITAL | Age: 25
Discharge: HOME | End: 2025-05-07
Payer: COMMERCIAL

## 2025-05-07 VITALS
WEIGHT: 200 LBS | RESPIRATION RATE: 18 BRPM | OXYGEN SATURATION: 100 % | TEMPERATURE: 98 F | HEART RATE: 94 BPM | DIASTOLIC BLOOD PRESSURE: 88 MMHG | HEIGHT: 66 IN | SYSTOLIC BLOOD PRESSURE: 136 MMHG | BODY MASS INDEX: 32.14 KG/M2

## 2025-05-07 DIAGNOSIS — S99.911A INJURY OF RIGHT ANKLE, INITIAL ENCOUNTER: Primary | ICD-10-CM

## 2025-05-07 PROCEDURE — 73700 CT LOWER EXTREMITY W/O DYE: CPT | Performed by: NURSE PRACTITIONER

## 2025-05-07 PROCEDURE — 99284 EMERGENCY DEPT VISIT MOD MDM: CPT

## 2025-05-07 PROCEDURE — 99283 EMERGENCY DEPT VISIT LOW MDM: CPT

## 2025-05-07 RX ORDER — IBUPROFEN 600 MG/1
600 TABLET, FILM COATED ORAL ONCE
Status: COMPLETED | OUTPATIENT
Start: 2025-05-07 | End: 2025-05-07

## 2025-05-08 ENCOUNTER — TELEPHONE (OUTPATIENT)
Dept: ORTHOPEDICS CLINIC | Facility: CLINIC | Age: 25
End: 2025-05-08

## 2025-05-08 NOTE — TELEPHONE ENCOUNTER
Patient called to schedule for right ankle injury. Scheduled with Dr Diaz on 5/19. Patient requesting appointment after 5pm. Is patient able to be seen sooner then next available   Future Appointments   Date Time Provider Department Center   5/19/2025  3:15 PM Chas Diaz DPM [id number]

## 2025-05-08 NOTE — ED INITIAL ASSESSMENT (HPI)
Pt arrives to triage with c/o right ankle injury. Pt injured ankle approx 1 week ago, went to [facility] and was told it was sprained, no break.  Today pt was going down the stairs and heard a \"crack\".    Visual swelling noted to right ankle

## 2025-05-08 NOTE — ED PROVIDER NOTES
Patient Seen in: [facility] Emergency Department      History     Chief Complaint   Patient presents with    Leg or Foot Injury     Stated Complaint: R ankle injury    Subjective:   25yo/f w no chronic medical problems reports w right ankle pain. Patient injury her right ankle at work 1 week ago with negative imaging of her foot and ankle. Took off supportive brace today. Now w pain. A \"cracking sound\" and sensation. No other injuries. Better w rest and elevation.           History of Present Illness               Objective:     History reviewed. No pertinent past medical history.           Past Surgical History:   Procedure Laterality Date    Knee surgery Left                 Social History     Socioeconomic History    Marital status: Single   Tobacco Use    Smoking status: Never     Passive exposure: Never    Smokeless tobacco: Never   Vaping Use    Vaping status: Never Used   Substance and Sexual Activity    Alcohol use: Yes    Drug use: Yes     Types: Cannabis                                Physical Exam     ED Triage Vitals [05/07/25 2001]   /88   Pulse 94   Resp 18   Temp 97.9 °F (36.6 °C)   Temp src    SpO2 100 %   O2 Device None (Room air)       Current Vitals:   Vital Signs  BP: 136/88  Pulse: 94  Resp: 18  Temp: 97.9 °F (36.6 °C)  MAP (mmHg): (!) 104    Oxygen Therapy  SpO2: 100 %  O2 Device: None (Room air)        Physical Exam  Vitals and nursing note reviewed.   Constitutional:       General: She is not in acute distress.     Appearance: She is well-developed.   HENT:      Head: Normocephalic and atraumatic.      Nose: Nose normal.      Mouth/Throat:      Mouth: Mucous membranes are moist.   Eyes:      Conjunctiva/sclera: Conjunctivae normal.      Pupils: Pupils are equal, round, and reactive to light.   Cardiovascular:      Rate and Rhythm: Normal rate and regular rhythm.      Heart sounds: Normal heart sounds.   Pulmonary:      Effort: Pulmonary effort is normal.      Breath sounds:  Normal breath sounds.   Abdominal:      General: Bowel sounds are normal.      Palpations: Abdomen is soft.   Musculoskeletal:         General: Tenderness present. No deformity. Normal range of motion.      Cervical back: Normal range of motion and neck supple.      Right lower leg: No edema.      Left lower leg: No edema.   Skin:     General: Skin is warm and dry.      Capillary Refill: Capillary refill takes less than 2 seconds.      Findings: No rash.      Comments: Normal color   Neurological:      General: No focal deficit present.      Mental Status: She is alert and oriented to person, place, and time.      GCS: GCS eye subscore is 4. GCS verbal subscore is 5. GCS motor subscore is 6.      Cranial Nerves: No cranial nerve deficit.      Gait: Gait normal.           Physical Exam                ED Course   Labs Reviewed - No data to display       Results      Impression  CONCLUSION:     Lateral ankle soft tissue swelling.     No collection.     No acute fracture or dislocation.           Dictated by (CST): Danial English MD on 5/07/2025 at 9:43 PM      Finalized by (CST): Danial English MD on 5/07/2025 at 9:45 PM                       [facility]              Medical Decision Making  23yo/f w hx and exam as stated; re-injury ankle  Ct non acute  No edema  No crepitus  Stable  Well appearing    Plan  Dc to home  Aircast      Amount and/or Complexity of Data Reviewed  Radiology:  Decision-making details documented in ED Course.    Risk  OTC drugs.  Prescription drug management.        Disposition and Plan     Clinical Impression:  1. Injury of right ankle, initial encounter         Disposition:  Discharge  5/7/2025  9:53 pm    Follow-up:  Jabari Camejo DPM  [address] [address]  [phone]    Follow up in 2 day(s)            Medications Prescribed:  Current Discharge Medication List          Supplementary Documentation:

## 2025-05-08 NOTE — TELEPHONE ENCOUNTER
Spoke with patient. She accepted appointment at 9:40 am on 5/9/25. Location, directions, and provider given. Canceled other appointment per request.

## 2025-05-09 ENCOUNTER — OFFICE VISIT (OUTPATIENT)
Dept: PODIATRY CLINIC | Facility: CLINIC | Age: 25
End: 2025-05-09

## 2025-05-09 VITALS — HEIGHT: 66 IN | WEIGHT: 200 LBS | BODY MASS INDEX: 32.14 KG/M2

## 2025-05-09 DIAGNOSIS — M25.571 ACUTE RIGHT ANKLE PAIN: ICD-10-CM

## 2025-05-09 DIAGNOSIS — S93.401A SPRAIN OF RIGHT ANKLE, UNSPECIFIED LIGAMENT, INITIAL ENCOUNTER: Primary | ICD-10-CM

## 2025-05-09 PROCEDURE — L4387 NON-PNEUM WALK BOOT PRE OTS: HCPCS | Performed by: STUDENT IN AN ORGANIZED HEALTH CARE EDUCATION/TRAINING PROGRAM

## 2025-05-09 PROCEDURE — 99204 OFFICE O/P NEW MOD 45 MIN: CPT | Performed by: STUDENT IN AN ORGANIZED HEALTH CARE EDUCATION/TRAINING PROGRAM

## 2025-05-09 RX ORDER — METHYLPREDNISOLONE 4 MG/1
TABLET ORAL
Qty: 21 TABLET | Refills: 0 | Status: SHIPPED | OUTPATIENT
Start: 2025-05-09

## 2025-05-09 NOTE — PROGRESS NOTES
[facility] Podiatry  Progress Note      Alissa Blakely is a 24 year old female.   Chief Complaint   Patient presents with    Consult    Ankle Pain     Pt is here for Right ankle pain injury work related, happened last week on Monday pt slipped and fell , xray in chart, a couple days later ptfell down the stairs, pt  Went to urgent care they stated nothing was broken.   Pt has CT in chart as well  5/10 pain scale             HPI:   Patient is a pleasant 24-year-old female presents to clinic accompanied by her mother for evaluation of a right ankle injury she initially sustained at work last Monday when she slipped and fell.  Patient also sustained a second injury when she fell down the stairs.  Patient has an x-ray of the foot and ankle as well as a CT of the ankle which shows no signs of fractures or dislocations.  Patient has pain rating it a 5 out of 10.  She has been nonweightbearing with crutches and an Aircast.      Allergies: Patient has no known allergies.    Current Medications[1]   Past Medical History[2]   Past Surgical History[3]   Family History[4]   Social Hx on file[5]        REVIEW OF SYSTEMS:     Denies nause, fever, chills  No calf pain  Denies chest pain or SOB      EXAM:   Ht 5' 6\" (1.676 m)   Wt 200 lb (90.7 kg)   LMP 04/08/2025 (Exact Date)   BMI 32.28 kg/m²   GENERAL: well developed, well nourished, in no apparent distress  EXTREMITIES:   1. Integument: Normal skin temperature and turgor  2. Vascular: Dorsalis pedis two out of four bilateral and posterior tibial pulses two out of   four bilateral, capillary refill normal.  Edema to entire right ankle.   3. Musculoskeletal: All muscle groups are graded 5 out of 5 in the foot and ankle.  Tenderness palpation to right ankle.  Negative anterior drawer sign.  Achilles tendon intact.   4. Neurological: Normal sharp dull sensation; reflexes normal.      CT ANKLE RIGHT (XGV=49647)  Result Date: 5/7/2025  CONCLUSION:   Lateral ankle soft  tissue swelling.  No collection.  No acute fracture or dislocation.    Dictated by (CST): Danial English MD on 5/07/2025 at 9:43 PM     Finalized by (CST): Danial English MD on 5/07/2025 at 9:45 PM          XR ANKLE (MIN 3 VIEWS), RIGHT (CPT=73610)  Result Date: 4/29/2025  CONCLUSION:  1. No acute fracture or subluxation.    Dictated by (CST): Jose Antonio Macedo MD on 4/29/2025 at 6:38 PM     Finalized by (CST): Jose Antonio Macedo MD on 4/29/2025 at 6:39 PM          XR FOOT, COMPLETE (MIN 3 VIEWS), RIGHT (CPT=73630)  Result Date: 4/29/2025  CONCLUSION: Normal examination.     Dictated by (CST): Jose Antonio Macedo MD on 4/29/2025 at 6:38 PM     Finalized by (CST): Jose Antonio Macedo MD on 4/29/2025 at 6:38 PM             ASSESSMENT AND PLAN:   Diagnoses and all orders for this visit:    Sprain of right ankle, unspecified ligament, initial encounter    Acute right ankle pain    Other orders  -     methylPREDNISolone 4 MG Oral Tablet Therapy Pack; Take per package insert (instructions). Take as directed on the box        Plan:       Described in detail the anatomy of the lateral ankle ligaments and how an inversion type ankle sprain can place significant stress on these ligaments  Patient was given instruction to stay off the ankle as much as possible  Advised the use of compression stockings or ACE wraps to help decrease swelling/edema.  Discussed the importance of immobilization.  Patient to slowly transition nonweightbearing to right lower extremity weightbearing in the cam boot as tolerated.  Dispense short CAM boot  Discussed conservative management   Discussed surgical management and indications for both.  Will move forward with conservative management.  Recommend cryotherapy/icing, rest, and elevation.  RTC in 2 weeks        The patient indicates understanding of these issues and agrees to the plan.        Yana Partida DPM        [1]   Current Outpatient Medications   Medication Sig Dispense Refill    methylPREDNISolone  4 MG Oral Tablet Therapy Pack Take per package insert (instructions). Take as directed on the box 21 tablet 0    Norethin-Eth Estrad-Fe Biphas (LO LOESTRIN FE) 1 MG-10 MCG / 10 MCG Oral Tab Take 1 tablet by mouth daily. 28 tablet 12    clotrimazole-betamethasone 1-0.05 % External Cream Apply 1 Application topically 2 (two) times daily as needed. (Patient not taking: Reported on 5/9/2025) 15 g 0    Phentermine HCl 15 MG Oral Cap Take 1 capsule (15 mg total) by mouth every morning. (Patient not taking: Reported on 5/9/2025) 30 capsule 0   [2] History reviewed. No pertinent past medical history.  [3]   Past Surgical History:  Procedure Laterality Date    Knee surgery Left    [4]   Family History  Problem Relation Age of Onset    Diabetes Maternal Grandmother     Diabetes Paternal Grandmother     Cancer Maternal Aunt    [5]   Social History  Socioeconomic History    Marital status: Single   Tobacco Use    Smoking status: Never     Passive exposure: Never    Smokeless tobacco: Never   Vaping Use    Vaping status: Never Used   Substance and Sexual Activity    Alcohol use: Yes     Comment: socaially    Drug use: Yes     Types: Cannabis

## 2025-05-22 ENCOUNTER — OFFICE VISIT (OUTPATIENT)
Dept: PODIATRY CLINIC | Facility: CLINIC | Age: 25
End: 2025-05-22

## 2025-05-22 DIAGNOSIS — S93.401A SPRAIN AND STRAIN OF RIGHT ANKLE: Primary | ICD-10-CM

## 2025-05-22 DIAGNOSIS — S96.911A SPRAIN AND STRAIN OF RIGHT ANKLE: Primary | ICD-10-CM

## 2025-05-22 PROCEDURE — 99214 OFFICE O/P EST MOD 30 MIN: CPT | Performed by: STUDENT IN AN ORGANIZED HEALTH CARE EDUCATION/TRAINING PROGRAM

## 2025-05-22 RX ORDER — NAPROXEN 500 MG/1
500 TABLET ORAL 2 TIMES DAILY WITH MEALS
Qty: 60 TABLET | Refills: 0 | Status: SHIPPED | OUTPATIENT
Start: 2025-05-22 | End: 2025-06-21

## 2025-05-22 NOTE — PROGRESS NOTES
[facility] Podiatry  Progress Note      Alissa Blakely is a 24 year old female.   Chief Complaint   Patient presents with    Foot Pain     Right ankle pain injury from work f/u- pain rated 3/10- pt stated there is now a aching pain on the inside of the foot close to the toes             HPI:     Pleasant 24-year-old female who presents to clinic for follow-up from a Workmen's Comp. case of a right ankle injury she sustained.  Patient admits to completing the Medrol Dosepak which helped alleviate most of her pain as well as swelling.  She still admits to aching along the plantar aspect of her foot.  She has been ambulating in the cam boot as instructed.  Admits that she is walking in the cam boot versus barefoot.    Allergies: Patient has no known allergies.    Current Medications[1]   Past Medical History[2]   Past Surgical History[3]   Family History[4]   Social Hx on file[5]        REVIEW OF SYSTEMS:     Denies nause, fever, chills  No calf pain  Denies chest pain or SOB      EXAM:   [facility] 04/08/2025 (Exact Date)   GENERAL: well developed, well nourished, in no apparent distress  EXTREMITIES:   1. Integument: Normal skin temperature and turgor  2. Vascular: Dorsalis pedis two out of four bilateral and posterior tibial pulses two out of   four bilateral, capillary refill normal.  No edema to right lower extremity   3. Musculoskeletal: All muscle groups are graded 5 out of 5 in the foot and ankle.  Mild tenderness right ankle   4. Neurological: Normal sharp dull sensation; reflexes normal.             ASSESSMENT AND PLAN:   Diagnoses and all orders for this visit:    Sprain and strain of right ankle    Other orders  -     naproxen 500 MG Oral Tab; Take 1 tablet (500 mg total) by mouth 2 (two) times daily with meals.        Plan:     Patient seen and examined and findings discussed with patient.  Informed patient that overall her right lower extremity swelling has significantly decreased since last visit.  I did  discuss with patient that she can slowly transition into the supportive shoes within the course of next 2 weeks.  Patient may still use the cam boot as tolerated.  Will Rx naproxen to be taken as directed to help with pain and inflammation.  Elevate right lower extremity when resting.  Avoid high-impact lower extremity activities.  Follow-up in clinic in 2 weeks    The patient indicates understanding of these issues and agrees to the plan.        Yana Partida DPM          [1]   Current Outpatient Medications   Medication Sig Dispense Refill    naproxen 500 MG Oral Tab Take 1 tablet (500 mg total) by mouth 2 (two) times daily with meals. 60 tablet 0    methylPREDNISolone 4 MG Oral Tablet Therapy Pack Take per package insert (instructions). Take as directed on the box 21 tablet 0    clotrimazole-betamethasone 1-0.05 % External Cream Apply 1 Application topically 2 (two) times daily as needed. 15 g 0    Phentermine HCl 15 MG Oral Cap Take 1 capsule (15 mg total) by mouth every morning. 30 capsule 0    Norethin-Eth Estrad-Fe Biphas (LO LOESTRIN FE) 1 MG-10 MCG / 10 MCG Oral Tab Take 1 tablet by mouth daily. 28 tablet 12   [2] History reviewed. No pertinent past medical history.  [3]   Past Surgical History:  Procedure Laterality Date    Knee surgery Left    [4]   Family History  Problem Relation Age of Onset    Diabetes Maternal Grandmother     Diabetes Paternal Grandmother     Cancer Maternal Aunt    [5]   Social History  Socioeconomic History    Marital status: Single   Tobacco Use    Smoking status: Never     Passive exposure: Never    Smokeless tobacco: Never   Vaping Use    Vaping status: Never Used   Substance and Sexual Activity    Alcohol use: Yes     Comment: socaially    Drug use: Yes     Types: Cannabis

## (undated) NOTE — LETTER
5/9/2025          To Whom It May Concern:    Alissa Blakely is currently under my medical care and may not return to work at this time.    Please excuse Alissa for 5 days.  She may return to work on Thursday 05/15/2025.  Activity is restricted as follows: Patient must wear cam walker boot while working.    If you require additional information please contact our office.        Sincerely,    Yana Partida DPM

## (undated) NOTE — LETTER
Date & Time: 4/29/2025, 6:48 PM  Patient: Alissa Blakely  Encounter Provider(s):    Nava Soto PA-C       To Whom It May Concern:    Alissa Blakely was seen and treated in our department on 4/29/2025. She can return to work 5/1/2025 and she should be allowed to wear ankle splint without interruption.    If you have any questions or concerns, please do not hesitate to call.        _____________________________  Physician/APC Signature

## (undated) NOTE — LETTER
Date & Time: 5/7/2025, 10:12 PM  Patient: Alissa Blakely  Encounter Provider(s):    Paras Scott APRN       To Whom It May Concern:    Alissa Blakely was seen and treated in our department on 5/7/2025. She can return to work with these limitations: non-weight bearing on right lower extremity .    If you have any questions or concerns, please do not hesitate to call.        _____________________________  Physician/APC Signature

## (undated) NOTE — ED AVS SNAPSHOT
Ruddy Marquez   MRN: [id number]    Department:  [facility] Emergency Department   Date of Visit:  2/12/2018           Disclosure     Insurance plans vary and the physician(s) referred by the ER may not be covered by your plan.  Please contact CARE PHYSICIAN AT ONCE OR RETURN IMMEDIATELY TO THE EMERGENCY DEPARTMENT. If you have been prescribed any medication(s), please fill your prescription right away and begin taking the medication(s) as directed.   If you believe that any of the medications